# Patient Record
Sex: FEMALE | Race: WHITE | URBAN - METROPOLITAN AREA
[De-identification: names, ages, dates, MRNs, and addresses within clinical notes are randomized per-mention and may not be internally consistent; named-entity substitution may affect disease eponyms.]

---

## 2017-07-17 ENCOUNTER — HISTORICAL (OUTPATIENT)
Dept: RADIOLOGY | Facility: HOSPITAL | Age: 32
End: 2017-07-17

## 2017-07-28 ENCOUNTER — HISTORICAL (OUTPATIENT)
Dept: RADIOLOGY | Facility: HOSPITAL | Age: 32
End: 2017-07-28

## 2017-08-29 ENCOUNTER — HISTORICAL (OUTPATIENT)
Dept: LAB | Facility: HOSPITAL | Age: 32
End: 2017-08-29

## 2017-09-13 ENCOUNTER — HISTORICAL (OUTPATIENT)
Dept: ADMINISTRATIVE | Facility: HOSPITAL | Age: 32
End: 2017-09-13

## 2019-12-24 LAB — RAPID GROUP A STREP (OHS): POSITIVE

## 2022-04-07 ENCOUNTER — HISTORICAL (OUTPATIENT)
Dept: ADMINISTRATIVE | Facility: HOSPITAL | Age: 37
End: 2022-04-07

## 2022-04-23 VITALS
BODY MASS INDEX: 29.99 KG/M2 | WEIGHT: 180 LBS | DIASTOLIC BLOOD PRESSURE: 80 MMHG | OXYGEN SATURATION: 98 % | SYSTOLIC BLOOD PRESSURE: 118 MMHG | HEIGHT: 65 IN

## 2022-04-30 NOTE — OP NOTE
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    PROCEDURE:  Laparoscopic cholecystectomy via three trocar technique.    ANESTHESIA:  General with local infiltration.    ASSISTANT:  Greta Pisano NP    ESTIMATED BLOOD LOSS:  Negligible.    SPECIMENS:  Gallbladder and contents.    PROCEDURE IN DETAIL:  After proper informed consent was obtained, the patient was transported to the Operating Room where she was placed supine on the Operating Room table.  After an adequate level of general endotracheal anesthesia was achieved, the patient's abdomen was prepped and draped in standard sterile surgical fashion.  The operation commenced with infiltration of local anesthetic in infraumbilical position followed by transverse infraumbilical incision and blunt dissection at the base of the umbilical ligament, which was grasped, elevated and dilated slightly, which allowed insertion of a blunt tip trocar through the peritoneum under direct visualization.  Abdominal insufflation was undertaken to 15 mmHg pressure without significant hemodynamic change.  A camera was inserted and abdominal contents were inspected and photographed.  No masses, lesions or pathology were evident.  Following infiltration of local anesthetic and through minor stab incision, two epigastric 5 mm trocars were placed.  Utilizing these trocars, the gallbladder was grasped and elevated.  Fibrofatty tissues were dissected from the triangle of Calot until critical view was achieved.  Two clips were placed on the proximal cystic duct with one clip distally and one clip on the proximal cystic artery.  The duct was transected with scissors and artery was coagulated and transected with Bovie electrocautery, which was used to peel the gallbladder out from  its bed.  The gallbladder was then withdrawn through the umbilical trocar site.  Camera was reinserted.  Hemostasis was excellent.  There was no bile staining.  Umbilical fascia was closed with 0 Vicryl figure-of-eight suture times two.  The 5 mm trocars were then withdrawn after desufflating the abdomen.  The wounds were irrigated and then closed with a combination of 3-0 Vicryl and 4-0 Monocryl subcuticular closures.  Sterile dressings were applied.  The patient was awakened from her anesthesia and transported to     the Recovery Room in good and stable condition.  All sponge, needle and instrument counts were correct at the end of the case.  There were no complications.        ______________________________  MD CHANDRIKA Murray IV/ROSEMARY  DD:  09/13/2017  Time:  09:14PM  DT:  09/14/2017  Time:  01:10PM  Job #:  [id number]    cc: MD Onel Westfall III, II, MD

## 2022-09-15 ENCOUNTER — HISTORICAL (OUTPATIENT)
Dept: ADMINISTRATIVE | Facility: HOSPITAL | Age: 37
End: 2022-09-15